# Patient Record
Sex: MALE | Race: OTHER | HISPANIC OR LATINO | URBAN - METROPOLITAN AREA
[De-identification: names, ages, dates, MRNs, and addresses within clinical notes are randomized per-mention and may not be internally consistent; named-entity substitution may affect disease eponyms.]

---

## 2022-07-11 ENCOUNTER — EMERGENCY (EMERGENCY)
Facility: HOSPITAL | Age: 42
LOS: 1 days | Discharge: HOME | End: 2022-07-11
Attending: STUDENT IN AN ORGANIZED HEALTH CARE EDUCATION/TRAINING PROGRAM | Admitting: STUDENT IN AN ORGANIZED HEALTH CARE EDUCATION/TRAINING PROGRAM
Payer: COMMERCIAL

## 2022-07-11 VITALS
SYSTOLIC BLOOD PRESSURE: 123 MMHG | TEMPERATURE: 98 F | OXYGEN SATURATION: 97 % | RESPIRATION RATE: 18 BRPM | HEART RATE: 80 BPM | DIASTOLIC BLOOD PRESSURE: 79 MMHG

## 2022-07-11 PROCEDURE — 99284 EMERGENCY DEPT VISIT MOD MDM: CPT | Mod: 25

## 2022-07-11 PROCEDURE — 73110 X-RAY EXAM OF WRIST: CPT

## 2022-07-11 PROCEDURE — 73110 X-RAY EXAM OF WRIST: CPT | Mod: 26

## 2022-07-11 PROCEDURE — 73130 X-RAY EXAM OF HAND: CPT | Mod: 26,LT

## 2022-07-11 PROCEDURE — 73130 X-RAY EXAM OF HAND: CPT

## 2022-07-11 PROCEDURE — 73130 X-RAY EXAM OF HAND: CPT | Mod: 26

## 2022-07-11 PROCEDURE — 73110 X-RAY EXAM OF WRIST: CPT | Mod: 26,LT

## 2022-07-11 RX ORDER — ACETAMINOPHEN 500 MG
975 TABLET ORAL ONCE
Refills: 0 | Status: COMPLETED | OUTPATIENT
Start: 2022-07-11 | End: 2022-07-11

## 2022-07-11 RX ADMIN — Medication 975 MILLIGRAM(S): at 23:04

## 2022-07-11 NOTE — ED PROVIDER NOTE - PATIENT PORTAL LINK FT
You can access the FollowMyHealth Patient Portal offered by [facility] by registering at the following website: [email or web address]. By joining [employer]’s FollowMyHealth portal, you will also be able to view your health information using other applications (apps) compatible with our system.

## 2022-07-11 NOTE — ED PROVIDER NOTE - PHYSICAL EXAMINATION
VITAL SIGNS: I have reviewed nursing notes and confirm.  CONSTITUTIONAL: Well appearing, in no acute distress.   SKIN:  warm and dry, no acute rash.   HEAD:  normocephalic, atraumatic.  EYES: EOM intact; conjunctiva and sclera clear.  ENT: No nasal discharge; airway clear.   NECK: Supple; non tender.  CARD: S1, S2 normal; no murmurs, gallops, or rubs. Regular rate and rhythm.   RESP:  Clear to auscultation b/l, no wheezes, rales or rhonchi.  ABD: Normal bowel sounds; soft; non-distended; non-tender; no guarding/ rebound.  EXT: Normal ROM. No clubbing, cyanosis. 2+ pulses to b/l ue/le. +L hand swelling with mild ttp on palmar and dorsal surface. no open wounds. no snuffbox tenderness. no wrist ttp. full rom. sensation intact  NEURO: Alert, oriented, grossly unremarkable  PSYCH: Cooperative, mood and affect appropriate.

## 2022-07-11 NOTE — ED PROVIDER NOTE - CLINICAL SUMMARY MEDICAL DECISION MAKING FREE TEXT BOX
41 yo male p/w L hand swelling and pain after punching a wall yesterday. Neurovascularly intact. Swelling and ttp of L hand- rule out fx.

## 2022-07-11 NOTE — ED PROVIDER NOTE - OBJECTIVE STATEMENT
41 yo male p/w L hand swelling and pain after punching a wall yesterday. No weakness, numbness, tingling. No other injuries. Denies being assaulted. No open wounds. No hx of L hand fractures.

## 2022-07-11 NOTE — ED PROVIDER NOTE - NSFOLLOWUPCLINICS_GEN_ALL_ED_FT
[facility]  Hand Surgery  [address]  [place] [address]  Phone: [phone]  Fax:

## 2022-07-11 NOTE — ED PROVIDER NOTE - NSFOLLOWUPINSTRUCTIONS_ED_ALL_ED_FT
[facility]                                                                                                                                                                                                                                                                                                                                                                                                                                                                                                                                                                                                              Muscle Strain      A muscle strain is an injury that occurs when a muscle is stretched beyond its normal length. Usually, a small number of muscle fibers are torn when this happens. There are three types of muscle strains. First-degree strains have the least amount of muscle fiber tearing and the least amount of pain. Second-degree and third-degree strains have more tearing and pain.    Usually, recovery from muscle strain takes 1–2 weeks. Complete healing normally takes 5–6 weeks.      What are the causes?    This condition is caused when a sudden, violent force is placed on a muscle and stretches it too far. This may occur with a fall, while lifting, or during sports.      What increases the risk?    This condition is more likely to develop in athletes and people who are physically active.      What are the signs or symptoms?    Symptoms of this condition include:  •Pain.      •Tenderness.      •Bruising.      •Swelling.      •Trouble using the muscle.        How is this diagnosed?    This condition is diagnosed based on a physical exam and your medical history. Tests may also be done, including an X-ray, ultrasound, or MRI.      How is this treated?    This condition is initially treated with PRICE therapy. This therapy involves:  •Protecting the muscle from being injured again.      •Resting the injured muscle.      •Icing the injured muscle.      •Applying pressure (compression) to the injured muscle. This may be done with a splint or elastic bandage.      •Raising (elevating) the injured muscle.      Your health care provider may also recommend medicine for pain.      Follow these instructions at home:    If you have a removable splint:     •Wear the splint as told by your health care provider. Remove it only as told by your health care provider.       •Check the skin around the splint every day. Tell your health care provider about any concerns.      •Loosen the splint if your fingers or toes tingle, become numb, or turn cold and blue.      •Keep the splint clean.    •If the splint is not waterproof:  •Do not let it get wet.      •Cover it with a watertight covering when you take a bath or a shower.          Managing pain, stiffness, and swelling   Bag of ice on a towel on the skin. •If directed, put ice on the injured area. To do this:  •If you have a removable splint, remove it as told by your health care provider.      •Put ice in a plastic bag.      •Place a towel between your skin and the bag.      •Leave the ice on for 20 minutes, 2–3 times a day.      •Remove the ice if your skin turns bright red. This is very important. If you cannot feel pain, heat, or cold, you have a greater risk of damage to the area.        •Move your fingers or toes often to reduce stiffness and swelling.      •Raise (elevate) the injured area above the level of your heart while you are sitting or lying down.      •Wear an elastic bandage as told by your health care provider. Make sure that it is not too tight.      General instructions     •Take over-the-counter and prescription medicines only as told by your health care provider. Treatment may include muscle relaxants or medicines for pain and inflammation that are taken by mouth or applied to the skin.      •Restrict your activity and rest the injured muscle as told by your health care provider. Gentle movements may be allowed.      •If physical therapy was prescribed, do exercises as told by your health care provider.      • Do not put pressure on any part of the splint until it is fully hardened. This may take several hours.      • Do not use any products that contain nicotine or tobacco. These products include cigarettes, chewing tobacco, and vaping devices, such as e-cigarettes. If you need help quitting, ask your health care provider.      •Ask your health care provider when it is safe to drive if you have a splint.      •Keep all follow-up visits. This is important.        How is this prevented?    Warm up before exercising. This helps to prevent future muscle strains.      Contact a health care provider if:    •You have more pain or swelling in the injured area.        Get help right away if:    •You have numbness or tingling in the injured area.      •You lose a lot of strength in the injured area.        Summary    •A muscle strain is an injury that occurs when a muscle is stretched beyond its normal length.      •This condition is caused when a sudden, violent force is placed on a muscle and stretches it too far.      •This condition is initially treated with PRICE therapy, which involves protecting, resting, icing, compressing, and elevating.      •Gentle movements may be allowed. If physical therapy was prescribed, do exercises as told by your health care provider.      This information is not intended to replace advice given to you by your health care provider. Make sure you discuss any questions you have with your health care provider.      Document Revised: 03/07/2022 Document Reviewed: 03/07/2022    Elsevier Patient Education © 2022 [employer]

## 2022-07-15 DIAGNOSIS — S63.92XA SPRAIN OF UNSPECIFIED PART OF LEFT WRIST AND HAND, INITIAL ENCOUNTER: ICD-10-CM

## 2022-07-15 DIAGNOSIS — M79.641 PAIN IN RIGHT HAND: ICD-10-CM
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
## 2022-07-15 DIAGNOSIS — M79.89 OTHER SPECIFIED SOFT TISSUE DISORDERS: ICD-10-CM

## 2022-08-22 ENCOUNTER — EMERGENCY (EMERGENCY)
Facility: HOSPITAL | Age: 42
LOS: 1 days | Discharge: HOME | End: 2022-08-22
Admitting: EMERGENCY MEDICINE
Payer: COMMERCIAL

## 2022-08-22 VITALS
WEIGHT: 203.05 LBS | OXYGEN SATURATION: 100 % | HEART RATE: 88 BPM | SYSTOLIC BLOOD PRESSURE: 124 MMHG | DIASTOLIC BLOOD PRESSURE: 65 MMHG | TEMPERATURE: 98 F | RESPIRATION RATE: 16 BRPM

## 2022-08-22 PROCEDURE — 99283 EMERGENCY DEPT VISIT LOW MDM: CPT

## 2022-08-22 PROCEDURE — 73564 X-RAY EXAM KNEE 4 OR MORE: CPT | Mod: 26,LT

## 2022-08-22 PROCEDURE — 73564 X-RAY EXAM KNEE 4 OR MORE: CPT

## 2022-08-22 RX ORDER — IBUPROFEN 200 MG
600 TABLET ORAL ONCE
Refills: 0 | Status: COMPLETED | OUTPATIENT
Start: 2022-08-22 | End: 2022-08-22

## 2022-08-22 RX ADMIN — Medication 600 MILLIGRAM(S): at 03:02

## 2022-08-22 NOTE — ED PROVIDER NOTE - CLINICAL SUMMARY MEDICAL DECISION MAKING FREE TEXT BOX
43 yo m with pmh of htn, hld, dm c/o L knee pain for years. Pt states he feels a snapping in his knee. Takes tylenol for pain. Pt states pain was worse tonight. Denies new trauma. Denies fever, chills, numbness, tingling. L knee- no swelling, +crepitus, FROM. XR neg. Will refer to ortho

## 2022-08-22 NOTE — ED PROVIDER NOTE - OBJECTIVE STATEMENT
43 yo m with pmh of htn, hld, dm c/o L knee pain for years. Pt states he feels a snapping in his knee. Takes tylenol for pain. Pt states pain was worse tonight. Denies new trauma. Denies fever, chills, numbness, tingling.

## 2022-08-22 NOTE — ED PROVIDER NOTE - PATIENT PORTAL LINK FT
You can access the FollowMyHealth Patient Portal offered by [facility] by registering at the following website: [email or web address]. By joining [employer]’s FollowMyHealth portal, you will also be able to view your health information using other applications (apps) compatible with our system.

## 2022-08-22 NOTE — ED PROVIDER NOTE - PHYSICAL EXAMINATION
CONSTITUTIONAL: Well-appearing;  in no apparent distress.   HEAD: Normocephalic; atraumatic.   EYES: PERRL; EOM intact; conjunctiva and sclera clear  ENT: normal nose; no rhinorrhea; normal pharynx with no erythema or lesions.   NECK: Supple; non-tender;   CARDIOVASCULAR: rrr, no audible murmurs   RESPIRATORY: Breathing easily;   MSK: L knee- no swelling, +crepitus, FROM   EXT: No cyanosis or edema; N/V intact  SKIN: Normal for age and race; warm; dry; good turgor; no apparent lesions or rash.

## 2022-08-22 NOTE — ED ADULT NURSE NOTE - OBJECTIVE STATEMENT
Patient to the ED with complaint of L knee pain described as popping.  Patient endorsed that he hyper extended knee in 1994 and has had pain in knee since then, reports that he takes tylenol at home for pain, with no relief.  Denies any new injury

## 2022-08-22 NOTE — ED PROVIDER NOTE - CARE PROVIDER_API CALL
Ji Fields)  Orthopaedic Surgery; Sports Medicine  [address] [place] [address]  Phone: [phone]  Fax: [phone]  Follow Up Time:     Adryan Schneider)  Orthopaedic Surgery  [address] [place] [address]  Phone: [phone]  Fax: [phone]  Follow Up Time:

## 2022-08-25 DIAGNOSIS — M25.562 PAIN IN LEFT KNEE: ICD-10-CM

## 2022-08-25 DIAGNOSIS — I10 ESSENTIAL (PRIMARY) HYPERTENSION: ICD-10-CM

## 2022-08-25 DIAGNOSIS — E11.9 TYPE 2 DIABETES MELLITUS WITHOUT COMPLICATIONS: ICD-10-CM

## 2022-08-25 DIAGNOSIS — M23.8X2 OTHER INTERNAL DERANGEMENTS OF LEFT KNEE: ICD-10-CM

## 2022-08-25 DIAGNOSIS — E78.5 HYPERLIPIDEMIA, UNSPECIFIED: ICD-10-CM

## 2022-09-28 ENCOUNTER — EMERGENCY (EMERGENCY)
Facility: HOSPITAL | Age: 42
LOS: 1 days | Discharge: HOME | End: 2022-09-28
Attending: STUDENT IN AN ORGANIZED HEALTH CARE EDUCATION/TRAINING PROGRAM | Admitting: STUDENT IN AN ORGANIZED HEALTH CARE EDUCATION/TRAINING PROGRAM
Payer: COMMERCIAL

## 2022-09-28 VITALS
RESPIRATION RATE: 18 BRPM | TEMPERATURE: 98 F | HEART RATE: 87 BPM | OXYGEN SATURATION: 96 % | SYSTOLIC BLOOD PRESSURE: 114 MMHG | DIASTOLIC BLOOD PRESSURE: 73 MMHG

## 2022-09-28 VITALS
DIASTOLIC BLOOD PRESSURE: 71 MMHG | HEART RATE: 76 BPM | RESPIRATION RATE: 18 BRPM | SYSTOLIC BLOOD PRESSURE: 117 MMHG | TEMPERATURE: 98 F | OXYGEN SATURATION: 96 %

## 2022-09-28 DIAGNOSIS — E11.9 TYPE 2 DIABETES MELLITUS WITHOUT COMPLICATIONS: ICD-10-CM

## 2022-09-28 DIAGNOSIS — E78.5 HYPERLIPIDEMIA, UNSPECIFIED: ICD-10-CM

## 2022-09-28 DIAGNOSIS — M48.00 SPINAL STENOSIS, SITE UNSPECIFIED: ICD-10-CM

## 2022-09-28 DIAGNOSIS — H53.149 VISUAL DISCOMFORT, UNSPECIFIED: ICD-10-CM
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
## 2022-09-28 DIAGNOSIS — G89.11 ACUTE PAIN DUE TO TRAUMA: ICD-10-CM

## 2022-09-28 DIAGNOSIS — I10 ESSENTIAL (PRIMARY) HYPERTENSION: ICD-10-CM
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
## 2022-09-28 DIAGNOSIS — R51.9 HEADACHE, UNSPECIFIED: ICD-10-CM

## 2022-09-28 PROCEDURE — 99284 EMERGENCY DEPT VISIT MOD MDM: CPT | Mod: 25

## 2022-09-28 PROCEDURE — 70450 CT HEAD/BRAIN W/O DYE: CPT | Mod: MA

## 2022-09-28 PROCEDURE — 70450 CT HEAD/BRAIN W/O DYE: CPT | Mod: 26,MA

## 2022-09-28 PROCEDURE — 99284 EMERGENCY DEPT VISIT MOD MDM: CPT

## 2022-09-28 RX ORDER — FAMOTIDINE 10 MG/ML
20 INJECTION INTRAVENOUS DAILY
Refills: 0 | Status: DISCONTINUED | OUTPATIENT
Start: 2022-09-28 | End: 2022-10-01

## 2022-09-28 RX ORDER — IBUPROFEN 200 MG
800 TABLET ORAL ONCE
Refills: 0 | Status: COMPLETED | OUTPATIENT
Start: 2022-09-28 | End: 2022-09-28

## 2022-09-28 RX ADMIN — Medication 800 MILLIGRAM(S): at 03:27

## 2022-09-28 RX ADMIN — Medication 800 MILLIGRAM(S): at 02:47

## 2022-09-28 RX ADMIN — FAMOTIDINE 20 MILLIGRAM(S): 10 INJECTION INTRAVENOUS at 02:47

## 2022-09-28 NOTE — ED PROVIDER NOTE - NSFOLLOWUPCLINICS_GEN_ALL_ED_FT
[facility]  Neurology  [address] [place] [address]  Phone: [phone]  Fax: [phone]

## 2022-09-28 NOTE — ED PROVIDER NOTE - CLINICAL SUMMARY MEDICAL DECISION MAKING FREE TEXT BOX
pt w hx of htn, hld, dm, spinal stenosis c/b trach placement. here w headache, photophobia, nausea after head injury 1 week ago.   pt well appearing, sitting upright in chair. vitals stable. exam reassuring - no c-spine tenderness, neuro intact.   suspect concussive syndrome  no indication for imaging based on [place] head / c-spine rules but pt concerned about headaches ongoing so will do ct head at pt request.  otherwise analgesia prn  if imaging negative will dc w supportive care and neuro f/u prn.

## 2022-09-28 NOTE — ED ADULT NURSE NOTE - OBJECTIVE STATEMENT
pt c/o hitting his head on a bird cage on 9/20 and now c/o headache, and neck pain. able to move neck without difficulty. pt also noted to have a trach. able to speak clearly. denies any neuro deficits.

## 2022-09-28 NOTE — ED PROVIDER NOTE - PROGRESS NOTE DETAILS
ct head negative. given meds. remains neuro intact  counseled on course / expectations for concussion and head injury.   will give neuro follow up in case ongoing symptoms.     All results reviewed with the patient verbally. Discharge plan and return precautions d/w pt who verbalized understanding and agrees with plan. All questions answered. Vitals WNL. Ready for d/c.

## 2022-09-28 NOTE — ED PROVIDER NOTE - NS ED ATTENDING STATEMENT MOD
This was a shared visit with the MORA[facility]A. I reviewed and verified the documentation and independently performed the documented:

## 2022-09-28 NOTE — ED PROVIDER NOTE - PHYSICAL EXAMINATION
Constitutional : Well appearing, non-toxic, no acute distress. calm and cooperative. awake, alert, oriented to person, place, time/situation.  Head : head normocephalic, atraumatic  EENMT : eyes clear bilaterally, PERRL, EOMI. airway patent. moist mucous membranes. neck supple - trach in place.   Cardiac : Extremities warm and well perfused. 2+ radial and DP pulses. cap refill <2 seconds. no LE edema.  Resp : Respirations even and unlabored.   MSK :  range of motion is not limited, no muscle or joint tenderness  Back : No evidence of trauma. No spinal or CVA tenderness.  Vasc : Extremities warm and well perfused. 2+ radial and DP pulses. cap refill <2 seconds  Neuro : A&Ox3, CNII-XII grossly intact. visual fields intact, no nystagmus, normal speech and word-finding. 5/5 motor in UE and LE, sensation intact throughout. ambulating with a steady gait. normal finger to nose. negative Romberg, no pronator drift   Skin : Skin normal color for race, warm, dry and intact. No evidence of rash.  Psych : Alert and oriented to person, place, time/situation. normal mood and affect. no apparent risk to self or others.

## 2022-09-28 NOTE — ED PROVIDER NOTE - OBJECTIVE STATEMENT
42 yr old male, history of htn, hld, dm, spinal stenosis c/b trach placement, presents to the Emergency Department with headache. pt reports one week ago (9/20) he walked into a bird cage. hit his head. no loc, no ac/asa use. did not fall to ground. no preceding symptoms. since this time has had mild generalized headaches, fluctuates in intensity. improves w tylenol / motrin. also w mild nausea, photophobia. concerned he might have a concussion so came for evaluation. symptoms have overall been improving since episode. reports his normal neck pain. no new or worsening pain from injury.   pt denies vision changes, dizziness, lightheadedness, near-syncope, vomiting, speech or gait changes, extremity weakness / numbness / tingling.

## 2022-09-28 NOTE — ED PROVIDER NOTE - ATTENDING APP SHARED VISIT CONTRIBUTION OF CARE
42 yr old male, history of htn, hld, dm, spinal stenosispresenting for 1 week of headache since hitting his head on a birdcage accidentally. neurologiclaly intact in ED, no physicla signs of traumatic injury. ct head negative. likely concussion.

## 2022-09-28 NOTE — ED PROVIDER NOTE - NSFOLLOWUPINSTRUCTIONS_ED_ALL_ED_FT
Your CT head was reviewed and the results are on the pages to follow. You can expect that the area that you hit will be sore for a few days but then it should start to feel better. Get plenty of rest over the next week. Avoid strenuous activity which may put you at risk for another head injury. Follow up with your Primary Care Doctor within one week for a re-evaluation to be sure no other workup is necessary. Return to the Emergency Department for persistent, worsening, or new symptoms including loss of consciousness, dizziness/lightheadedness, severe headache, change in vision or loss of vision, uncontrollable nausea/vomiting, or any other serious concerns.    PAIN MEDICATION - TYLENOL - Take tylenol as needed for pain - 650mg every 4-6 hours as needed for pain, do not exceed 4000mg in 24 hours - THESE MEDICATIONS DO NOT REQUIRE A PRESCRIPT

## 2022-09-28 NOTE — ED PROVIDER NOTE - PATIENT PORTAL LINK FT
You can access the FollowMyHealth Patient Portal offered by [facility] by registering at the following website: [email or web address]. By joining [employer]’s FollowMyHealth portal, you will also be able to view your health information using other applications (apps) compatible with our system.

## 2025-06-11 NOTE — ED ADULT NURSE NOTE - IN THE PAST 12 MONTHS HAVE YOU USED DRUGS OTHER THAN THOSE REQUIRED FOR MEDICAL REASON?
#L first toe OA  decrease oral intake, generalized weakness and impaired ambulation.  CXR   no focal consolidation or pneumothorax   Xray left foot  without fracture or dislocation but notable for Severe osteoarthritis of the first metatarsophalangeal joint  Patient is following with podiatry for left big toe pain  PT consult
No